# Patient Record
Sex: FEMALE | Race: WHITE | NOT HISPANIC OR LATINO | Employment: PART TIME | URBAN - METROPOLITAN AREA
[De-identification: names, ages, dates, MRNs, and addresses within clinical notes are randomized per-mention and may not be internally consistent; named-entity substitution may affect disease eponyms.]

---

## 2021-11-17 ENCOUNTER — NEW SKIN PROBLEM (OUTPATIENT)
Dept: URBAN - METROPOLITAN AREA 39 | Facility: CLINIC | Age: 56
Setting detail: DERMATOLOGY
End: 2021-11-17

## 2021-11-17 ENCOUNTER — RX ONLY (RX ONLY)
Age: 56
End: 2021-11-17

## 2021-11-17 DIAGNOSIS — D22.39 MELANOCYTIC NEVI OF OTHER PARTS OF FACE: ICD-10-CM

## 2021-11-17 DIAGNOSIS — L57.8 OTHER SKIN CHANGES DUE TO CHRONIC EXPOSURE TO NONIONIZING RADIATION: ICD-10-CM

## 2021-11-17 DIAGNOSIS — L82.1 OTHER SEBORRHEIC KERATOSIS: ICD-10-CM

## 2021-11-17 PROBLEM — L57.0 ACTINIC KERATOSIS: Status: RESOLVED | Noted: 2021-11-17

## 2021-11-17 PROBLEM — L20.82 FLEXURAL ECZEMA: Status: RESOLVED | Noted: 2021-11-17

## 2021-11-17 PROCEDURE — 11103 TANGNTL BX SKIN EA SEP/ADDL: CPT

## 2021-11-17 PROCEDURE — 11102 TANGNTL BX SKIN SINGLE LES: CPT

## 2021-11-17 PROCEDURE — 99204 OFFICE O/P NEW MOD 45 MIN: CPT

## 2021-11-17 PROCEDURE — 17000 DESTRUCT PREMALG LESION: CPT

## 2021-11-17 PROCEDURE — 17003 DESTRUCT PREMALG LES 2-14: CPT

## 2021-11-17 RX ORDER — FLUTICASONE PROPIONATE 0.5 MG/G
CREAM TOPICAL
Qty: 30 | Refills: 0
Start: 2021-11-17

## 2021-12-01 ENCOUNTER — RX ONLY (RX ONLY)
Age: 56
End: 2021-12-01

## 2021-12-01 ENCOUNTER — FOLLOW-UP (OUTPATIENT)
Dept: URBAN - METROPOLITAN AREA 39 | Facility: CLINIC | Age: 56
Setting detail: DERMATOLOGY
End: 2021-12-01

## 2021-12-01 DIAGNOSIS — L57.0 ACTINIC KERATOSIS: ICD-10-CM

## 2021-12-01 PROCEDURE — 17003 DESTRUCT PREMALG LES 2-14: CPT

## 2021-12-01 PROCEDURE — 17000 DESTRUCT PREMALG LESION: CPT

## 2021-12-01 PROCEDURE — 99214 OFFICE O/P EST MOD 30 MIN: CPT

## 2021-12-01 PROCEDURE — 11102 TANGNTL BX SKIN SINGLE LES: CPT

## 2021-12-01 RX ORDER — TRETINOIN 0.25 MG/G
CREAM TOPICAL
Qty: 45 | Refills: 3
Start: 2021-12-01

## 2021-12-01 RX ORDER — DOXYCYCLINE 40 MG/1
1 CAPSULE CAPSULE ORAL ONCE A DAY
Qty: 30 | Refills: 5
Start: 2021-12-01

## 2021-12-03 PROBLEM — L71.8 OTHER ROSACEA: Status: RESOLVED | Noted: 2021-12-03

## 2021-12-13 ENCOUNTER — RX ONLY (RX ONLY)
Age: 56
End: 2021-12-13

## 2021-12-13 RX ORDER — DOXYCYCLINE HYCLATE 20 MG/1
1 TABLET TABLET, FILM COATED ORAL TWICE A DAY
Qty: 60 | Refills: 11
Start: 2021-12-13

## 2022-01-12 ENCOUNTER — FOLLOW-UP (OUTPATIENT)
Dept: URBAN - METROPOLITAN AREA 39 | Facility: CLINIC | Age: 57
Setting detail: DERMATOLOGY
End: 2022-01-12

## 2022-01-12 DIAGNOSIS — L57.0 ACTINIC KERATOSIS: ICD-10-CM
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
## 2022-01-12 PROBLEM — B07.8 OTHER VIRAL WARTS: Status: RESOLVED | Noted: 2022-01-12

## 2022-01-12 PROBLEM — L71.8 OTHER ROSACEA: Status: RESOLVED | Noted: 2022-01-12

## 2022-01-12 PROCEDURE — 99213 OFFICE O/P EST LOW 20 MIN: CPT

## 2022-01-12 PROCEDURE — 17000 DESTRUCT PREMALG LESION: CPT

## 2022-01-12 PROCEDURE — 17110 DESTRUCT B9 LESION 1-14: CPT

## 2022-01-12 PROCEDURE — 17003 DESTRUCT PREMALG LES 2-14: CPT

## 2022-12-13 ENCOUNTER — APPOINTMENT (OUTPATIENT)
Dept: URBAN - METROPOLITAN AREA SURGERY 13 | Age: 57
Setting detail: DERMATOLOGY
End: 2022-12-20

## 2022-12-13 DIAGNOSIS — D22 MELANOCYTIC NEVI: ICD-10-CM

## 2022-12-13 DIAGNOSIS — L81.4 OTHER MELANIN HYPERPIGMENTATION: ICD-10-CM

## 2022-12-13 DIAGNOSIS — D485 NEOPLASM OF UNCERTAIN BEHAVIOR OF SKIN: ICD-10-CM

## 2022-12-13 DIAGNOSIS — B07.8 OTHER VIRAL WARTS: ICD-10-CM

## 2022-12-13 DIAGNOSIS — L57.0 ACTINIC KERATOSIS: ICD-10-CM

## 2022-12-13 DIAGNOSIS — L82.1 OTHER SEBORRHEIC KERATOSIS: ICD-10-CM

## 2022-12-13 DIAGNOSIS — D18.0 HEMANGIOMA: ICD-10-CM

## 2022-12-13 PROBLEM — D22.5 MELANOCYTIC NEVI OF TRUNK: Status: ACTIVE | Noted: 2022-12-13

## 2022-12-13 PROBLEM — D48.5 NEOPLASM OF UNCERTAIN BEHAVIOR OF SKIN: Status: ACTIVE | Noted: 2022-12-13

## 2022-12-13 PROBLEM — D18.01 HEMANGIOMA OF SKIN AND SUBCUTANEOUS TISSUE: Status: ACTIVE | Noted: 2022-12-13

## 2022-12-13 PROCEDURE — OTHER BIOPSY BY SHAVE METHOD: OTHER

## 2022-12-13 PROCEDURE — 17110 DESTRUCT B9 LESION 1-14: CPT

## 2022-12-13 PROCEDURE — 11103 TANGNTL BX SKIN EA SEP/ADDL: CPT | Mod: 59

## 2022-12-13 PROCEDURE — OTHER REASSURANCE: OTHER

## 2022-12-13 PROCEDURE — 99213 OFFICE O/P EST LOW 20 MIN: CPT | Mod: 25

## 2022-12-13 PROCEDURE — OTHER COUNSELING: OTHER

## 2022-12-13 PROCEDURE — 17003 DESTRUCT PREMALG LES 2-14: CPT | Mod: 59

## 2022-12-13 PROCEDURE — 11102 TANGNTL BX SKIN SINGLE LES: CPT | Mod: 59

## 2022-12-13 PROCEDURE — OTHER LIQUID NITROGEN: OTHER

## 2022-12-13 PROCEDURE — 17000 DESTRUCT PREMALG LESION: CPT | Mod: 59

## 2022-12-13 PROCEDURE — OTHER SUNSCREEN RECOMMENDATIONS: OTHER

## 2022-12-13 ASSESSMENT — LOCATION DETAILED DESCRIPTION DERM
LOCATION DETAILED: LEFT INFERIOR UPPER BACK
LOCATION DETAILED: LEFT MEDIAL DISTAL PRETIBIAL REGION
LOCATION DETAILED: RIGHT LATERAL MALAR CHEEK
LOCATION DETAILED: LEFT LATERAL SUPERIOR CHEST
LOCATION DETAILED: LEFT INFERIOR FOREHEAD
LOCATION DETAILED: LEFT MEDIAL SUPERIOR CHEST
LOCATION DETAILED: RIGHT LATERAL SUPERIOR CHEST
LOCATION DETAILED: LEFT CENTRAL MALAR CHEEK
LOCATION DETAILED: EPIGASTRIC SKIN
LOCATION DETAILED: LEFT THENAR EMINENCE
LOCATION DETAILED: RIGHT MID-UPPER BACK
LOCATION DETAILED: LEFT DISTAL POSTERIOR UPPER ARM
LOCATION DETAILED: RIGHT INFERIOR MEDIAL MALAR CHEEK
LOCATION DETAILED: RIGHT DISTAL POSTERIOR UPPER ARM

## 2022-12-13 ASSESSMENT — LOCATION SIMPLE DESCRIPTION DERM
LOCATION SIMPLE: LEFT CHEEK
LOCATION SIMPLE: RIGHT POSTERIOR UPPER ARM
LOCATION SIMPLE: LEFT HAND
LOCATION SIMPLE: CHEST
LOCATION SIMPLE: LEFT POSTERIOR UPPER ARM
LOCATION SIMPLE: ABDOMEN
LOCATION SIMPLE: LEFT UPPER BACK
LOCATION SIMPLE: LEFT FOREHEAD
LOCATION SIMPLE: RIGHT UPPER BACK
LOCATION SIMPLE: RIGHT CHEEK
LOCATION SIMPLE: LEFT PRETIBIAL REGION

## 2022-12-13 ASSESSMENT — LOCATION ZONE DERM
LOCATION ZONE: TRUNK
LOCATION ZONE: LEG
LOCATION ZONE: HAND
LOCATION ZONE: FACE
LOCATION ZONE: ARM

## 2022-12-13 NOTE — PROCEDURE: LIQUID NITROGEN
Add 52 Modifier (Optional): no
Medical Necessity Information: It is in your best interest to select a reason for this procedure from the list below. All of these items fulfill various CMS LCD requirements except the new and changing color options.
Consent: The patient's consent was obtained including but not limited to risks of crusting, scabbing, blistering, scarring, darker or lighter pigmentary change, recurrence, incomplete removal and infection.
Detail Level: Detailed
Show Topical Anesthesia Variable?: Yes
Application Tool (Optional): Liquid Nitrogen Sprayer
Duration Of Freeze Thaw-Cycle (Seconds): 5
Post-Care Instructions: I reviewed with the patient in detail post-care instructions. Patient is to wear sunprotection, and avoid picking at any of the treated lesions. Pt may apply Vaseline to crusted or scabbing areas.
Number Of Freeze-Thaw Cycles: 3 freeze-thaw cycles
Spray Paint Text: The liquid nitrogen was applied to the skin utilizing a spray paint frosting technique.
Medical Necessity Clause: This procedure was medically necessary because the lesions that were treated were:
Number Of Freeze-Thaw Cycles: 1 freeze-thaw cycle

## 2022-12-13 NOTE — PROCEDURE: COUNSELING
Detail Level: Generalized
Detail Level: Zone
Detail Level: Detailed
Topical Salicylic Acid Recommendations: Wart Stick apply qhs

## 2022-12-20 ENCOUNTER — APPOINTMENT (OUTPATIENT)
Dept: URBAN - METROPOLITAN AREA SURGERY 13 | Age: 57
Setting detail: DERMATOLOGY
End: 2022-12-20

## 2022-12-20 PROBLEM — C44.729 SQUAMOUS CELL CARCINOMA OF SKIN OF LEFT LOWER LIMB, INCLUDING HIP: Status: ACTIVE | Noted: 2022-12-20

## 2022-12-20 PROCEDURE — OTHER MOHS SURGERY PHONE CONSULTATION: OTHER

## 2023-01-02 ENCOUNTER — APPOINTMENT (OUTPATIENT)
Dept: URBAN - METROPOLITAN AREA SURGERY 13 | Age: 58
Setting detail: DERMATOLOGY
End: 2023-01-10

## 2023-01-02 PROBLEM — C44.729 SQUAMOUS CELL CARCINOMA OF SKIN OF LEFT LOWER LIMB, INCLUDING HIP: Status: ACTIVE | Noted: 2023-01-02

## 2023-01-02 PROCEDURE — OTHER EXCISION: OTHER

## 2023-01-02 PROCEDURE — OTHER PRESCRIPTION: OTHER

## 2023-01-02 PROCEDURE — 11602 EXC TR-EXT MAL+MARG 1.1-2 CM: CPT

## 2023-01-02 PROCEDURE — 12032 INTMD RPR S/A/T/EXT 2.6-7.5: CPT

## 2023-01-02 RX ORDER — DOXYCYCLINE 100 MG/1
CAPSULE ORAL
Qty: 14 | Refills: 0 | Status: ERX | COMMUNITY
Start: 2023-01-02

## 2023-01-02 NOTE — PROCEDURE: EXCISION
What Type Of Note Output Would You Prefer (Optional)?: Bullet Format Hpi Title: Evaluation of Skin Lesions How Severe Are Your Spot(S)?: mild Have Your Spot(S) Been Treated In The Past?: has not been treated Family Member: Mother Posterior Auricular Interpolation Flap Text: A decision was made to reconstruct the defect utilizing an interpolation axial flap and a staged reconstruction.  A telfa template was made of the defect.  This telfa template was then used to outline the posterior auricular interpolation flap.  The donor area for the pedicle flap was then injected with anesthesia.  The flap was excised through the skin and subcutaneous tissue down to the layer of the underlying musculature.  The pedicle flap was carefully excised within this deep plane to maintain its blood supply.  The edges of the donor site were undermined.   The donor site was closed in a primary fashion.  The pedicle was then rotated into position and sutured.  Once the tube was sutured into place, adequate blood supply was confirmed with blanching and refill.  The pedicle was then wrapped with xeroform gauze and dressed appropriately with a telfa and gauze bandage to ensure continued blood supply and protect the attached pedicle.

## 2023-01-17 ENCOUNTER — APPOINTMENT (OUTPATIENT)
Dept: URBAN - METROPOLITAN AREA SURGERY 13 | Age: 58
Setting detail: DERMATOLOGY
End: 2023-01-18

## 2023-01-17 DIAGNOSIS — Z48.02 ENCOUNTER FOR REMOVAL OF SUTURES: ICD-10-CM

## 2023-01-17 PROCEDURE — OTHER SUTURE REMOVAL (GLOBAL PERIOD): OTHER

## 2023-01-17 PROCEDURE — OTHER COUNSELING: OTHER

## 2023-01-17 ASSESSMENT — LOCATION ZONE DERM: LOCATION ZONE: LEG

## 2023-01-17 ASSESSMENT — LOCATION SIMPLE DESCRIPTION DERM: LOCATION SIMPLE: LEFT PRETIBIAL REGION

## 2023-01-17 ASSESSMENT — LOCATION DETAILED DESCRIPTION DERM: LOCATION DETAILED: LEFT MEDIAL DISTAL PRETIBIAL REGION

## 2023-01-17 NOTE — PROCEDURE: SUTURE REMOVAL (GLOBAL PERIOD)
Detail Level: Detailed
Add 21353 Cpt? (Important Note: In 2017 The Use Of 53081 Is Being Tracked By Cms To Determine Future Global Period Reimbursement For Global Periods): no